# Patient Record
Sex: FEMALE | Race: WHITE | Employment: OTHER | URBAN - METROPOLITAN AREA
[De-identification: names, ages, dates, MRNs, and addresses within clinical notes are randomized per-mention and may not be internally consistent; named-entity substitution may affect disease eponyms.]

---

## 2022-09-01 ENCOUNTER — TRANSCRIBE ORDER (OUTPATIENT)
Dept: SCHEDULING | Age: 72
End: 2022-09-01

## 2022-09-01 DIAGNOSIS — Z96.642 STATUS POST TOTAL HIP REPLACEMENT, LEFT: Primary | ICD-10-CM

## 2022-09-01 DIAGNOSIS — M79.652 LEFT THIGH PAIN: ICD-10-CM

## 2022-09-01 DIAGNOSIS — M54.10 RADICULAR PAIN OF LOWER EXTREMITY: ICD-10-CM

## 2022-09-01 DIAGNOSIS — M54.16 LEFT LUMBAR RADICULOPATHY: ICD-10-CM

## 2022-09-29 ENCOUNTER — HOSPITAL ENCOUNTER (OUTPATIENT)
Dept: MRI IMAGING | Age: 72
Discharge: HOME | End: 2022-09-29
Attending: ORTHOPAEDIC SURGERY
Payer: MEDICARE

## 2022-09-29 DIAGNOSIS — Z96.642 STATUS POST TOTAL HIP REPLACEMENT, LEFT: ICD-10-CM

## 2022-09-29 DIAGNOSIS — M54.10 RADICULAR PAIN OF LOWER EXTREMITY: ICD-10-CM

## 2022-09-29 DIAGNOSIS — M54.16 LEFT LUMBAR RADICULOPATHY: ICD-10-CM

## 2022-09-29 DIAGNOSIS — M79.652 LEFT THIGH PAIN: ICD-10-CM

## 2022-09-29 PROCEDURE — 72148 MRI LUMBAR SPINE W/O DYE: CPT

## 2023-03-16 ENCOUNTER — TRANSCRIBE ORDER (OUTPATIENT)
Dept: SCHEDULING | Age: 73
End: 2023-03-16

## 2023-03-16 DIAGNOSIS — Z96.642 STATUS POST LEFT HIP REPLACEMENT: Primary | ICD-10-CM

## 2023-03-30 ENCOUNTER — HOSPITAL ENCOUNTER (OUTPATIENT)
Dept: NUCLEAR MEDICINE | Age: 73
Discharge: HOME | End: 2023-03-30
Attending: ORTHOPAEDIC SURGERY
Payer: MEDICARE

## 2023-03-30 ENCOUNTER — HOSPITAL ENCOUNTER (OUTPATIENT)
Dept: NUCLEAR MEDICINE | Age: 73
End: 2023-03-30
Attending: ORTHOPAEDIC SURGERY
Payer: MEDICARE

## 2023-03-30 DIAGNOSIS — Z96.642 STATUS POST LEFT HIP REPLACEMENT: ICD-10-CM

## 2023-03-30 PROCEDURE — 78300 BONE IMAGING LIMITED AREA: CPT

## 2023-03-30 PROCEDURE — A9503 TC99M MEDRONATE: HCPCS

## 2023-04-20 ENCOUNTER — TRANSCRIBE ORDER (OUTPATIENT)
Dept: SCHEDULING | Age: 73
End: 2023-04-20

## 2023-04-20 DIAGNOSIS — Z96.642 STATUS POST LEFT HIP REPLACEMENT: Primary | ICD-10-CM

## 2023-04-27 ENCOUNTER — HOSPITAL ENCOUNTER (OUTPATIENT)
Dept: CT IMAGING | Age: 73
End: 2023-04-27
Attending: ORTHOPAEDIC SURGERY
Payer: MEDICARE

## 2023-04-27 DIAGNOSIS — Z96.642 STATUS POST LEFT HIP REPLACEMENT: ICD-10-CM

## 2023-04-27 PROCEDURE — 73700 CT LOWER EXTREMITY W/O DYE: CPT

## 2023-05-22 RX ORDER — GUAIFENESIN 600 MG/1
600 TABLET, EXTENDED RELEASE ORAL 2 TIMES DAILY
COMMUNITY

## 2023-05-22 RX ORDER — SIMVASTATIN 20 MG
TABLET ORAL
COMMUNITY

## 2024-04-26 PROBLEM — Z96.649 PAIN DUE TO HIP JOINT PROSTHESIS (HCC): Status: ACTIVE | Noted: 2024-04-26

## 2024-04-26 PROBLEM — T84.84XA PAIN DUE TO HIP JOINT PROSTHESIS (HCC): Status: ACTIVE | Noted: 2024-04-26

## 2024-05-01 ENCOUNTER — HOSPITAL ENCOUNTER (OUTPATIENT)
Facility: HOSPITAL | Age: 74
Discharge: HOME | End: 2024-05-04
Payer: MEDICARE

## 2024-05-01 VITALS
BODY MASS INDEX: 23.76 KG/M2 | HEIGHT: 64 IN | HEART RATE: 69 BPM | DIASTOLIC BLOOD PRESSURE: 80 MMHG | SYSTOLIC BLOOD PRESSURE: 146 MMHG | TEMPERATURE: 97.8 F | WEIGHT: 139.2 LBS

## 2024-05-01 LAB
ABO + RH BLD: NORMAL
AMORPH CRY URNS QL MICRO: ABNORMAL
ANION GAP SERPL CALC-SCNC: 2 MMOL/L (ref 5–15)
APPEARANCE UR: CLEAR
BACTERIA URNS QL MICRO: NEGATIVE /HPF
BILIRUB UR QL: NEGATIVE
BLOOD GROUP ANTIBODIES SERPL: NORMAL
BUN SERPL-MCNC: 16 MG/DL (ref 6–20)
CALCIUM SERPL-MCNC: 10.3 MG/DL (ref 8.5–10.1)
CHLORIDE SERPL-SCNC: 108 MMOL/L (ref 97–108)
CO2 SERPL-SCNC: 32 MMOL/L (ref 21–32)
COLOR UR: ABNORMAL
CREAT SERPL-MCNC: 0.74 MG/DL (ref 0.55–1.02)
EKG ATRIAL RATE: 63 BPM
EKG DIAGNOSIS: NORMAL
EKG P AXIS: 48 DEGREES
EKG P-R INTERVAL: 134 MS
EKG Q-T INTERVAL: 402 MS
EKG QRS DURATION: 96 MS
EKG QTC CALCULATION (BAZETT): 411 MS
EKG R AXIS: -34 DEGREES
EKG T AXIS: 24 DEGREES
EKG VENTRICULAR RATE: 63 BPM
EPITH CASTS URNS QL MICRO: ABNORMAL /LPF
ERYTHROCYTE [DISTWIDTH] IN BLOOD BY AUTOMATED COUNT: 13 % (ref 11.5–14.5)
EST. AVERAGE GLUCOSE BLD GHB EST-MCNC: 103 MG/DL
GLUCOSE SERPL-MCNC: 88 MG/DL (ref 65–100)
GLUCOSE UR STRIP.AUTO-MCNC: NEGATIVE MG/DL
HBA1C MFR BLD: 5.2 % (ref 4–5.6)
HCT VFR BLD AUTO: 41.3 % (ref 35–47)
HGB BLD-MCNC: 13.5 G/DL (ref 11.5–16)
HGB UR QL STRIP: NEGATIVE
INR PPP: 1 (ref 0.9–1.1)
KETONES UR QL STRIP.AUTO: NEGATIVE MG/DL
LEUKOCYTE ESTERASE UR QL STRIP.AUTO: NEGATIVE
MCH RBC QN AUTO: 29.9 PG (ref 26–34)
MCHC RBC AUTO-ENTMCNC: 32.7 G/DL (ref 30–36.5)
MCV RBC AUTO: 91.4 FL (ref 80–99)
NITRITE UR QL STRIP.AUTO: NEGATIVE
NRBC # BLD: 0 K/UL (ref 0–0.01)
NRBC BLD-RTO: 0 PER 100 WBC
PH UR STRIP: 7 (ref 5–8)
PLATELET # BLD AUTO: 353 K/UL (ref 150–400)
PMV BLD AUTO: 10.6 FL (ref 8.9–12.9)
POTASSIUM SERPL-SCNC: 3.8 MMOL/L (ref 3.5–5.1)
PROT UR STRIP-MCNC: NEGATIVE MG/DL
PROTHROMBIN TIME: 10.4 SEC (ref 9–11.1)
RBC # BLD AUTO: 4.52 M/UL (ref 3.8–5.2)
RBC #/AREA URNS HPF: ABNORMAL /HPF (ref 0–5)
SODIUM SERPL-SCNC: 142 MMOL/L (ref 136–145)
SP GR UR REFRACTOMETRY: <1.005 (ref 1–1.03)
SPECIMEN EXP DATE BLD: NORMAL
URINE CULTURE IF INDICATED: ABNORMAL
UROBILINOGEN UR QL STRIP.AUTO: 0.2 EU/DL (ref 0.2–1)
WBC # BLD AUTO: 5.5 K/UL (ref 3.6–11)
WBC URNS QL MICRO: ABNORMAL /HPF (ref 0–4)

## 2024-05-01 PROCEDURE — 80048 BASIC METABOLIC PNL TOTAL CA: CPT

## 2024-05-01 PROCEDURE — 85027 COMPLETE CBC AUTOMATED: CPT

## 2024-05-01 PROCEDURE — 81001 URINALYSIS AUTO W/SCOPE: CPT

## 2024-05-01 PROCEDURE — 83036 HEMOGLOBIN GLYCOSYLATED A1C: CPT

## 2024-05-01 PROCEDURE — 85610 PROTHROMBIN TIME: CPT

## 2024-05-01 PROCEDURE — 93005 ELECTROCARDIOGRAM TRACING: CPT | Performed by: ORTHOPAEDIC SURGERY

## 2024-05-01 PROCEDURE — 86900 BLOOD TYPING SEROLOGIC ABO: CPT

## 2024-05-01 PROCEDURE — 86901 BLOOD TYPING SEROLOGIC RH(D): CPT

## 2024-05-01 PROCEDURE — APPNB30 APP NON BILLABLE TIME 0-30 MINS: Performed by: NURSE PRACTITIONER

## 2024-05-01 PROCEDURE — 86850 RBC ANTIBODY SCREEN: CPT

## 2024-05-01 PROCEDURE — 36415 COLL VENOUS BLD VENIPUNCTURE: CPT

## 2024-05-01 RX ORDER — DOCUSATE SODIUM 100 MG/1
100 CAPSULE, LIQUID FILLED ORAL 2 TIMES DAILY
COMMUNITY

## 2024-05-01 RX ORDER — ACETAMINOPHEN 160 MG
1 TABLET,DISINTEGRATING ORAL
COMMUNITY

## 2024-05-01 RX ORDER — IBUPROFEN 200 MG
400 TABLET ORAL EVERY 6 HOURS PRN
COMMUNITY

## 2024-05-01 ASSESSMENT — PAIN DESCRIPTION - PAIN TYPE: TYPE: ACUTE PAIN

## 2024-05-01 ASSESSMENT — PAIN DESCRIPTION - FREQUENCY: FREQUENCY: INTERMITTENT

## 2024-05-01 ASSESSMENT — PAIN - FUNCTIONAL ASSESSMENT: PAIN_FUNCTIONAL_ASSESSMENT: PREVENTS OR INTERFERES SOME ACTIVE ACTIVITIES AND ADLS

## 2024-05-01 ASSESSMENT — PAIN DESCRIPTION - DESCRIPTORS: DESCRIPTORS: ACHING;DULL

## 2024-05-01 ASSESSMENT — PAIN DESCRIPTION - ORIENTATION: ORIENTATION: LEFT

## 2024-05-01 ASSESSMENT — PAIN DESCRIPTION - LOCATION: LOCATION: HIP

## 2024-05-01 ASSESSMENT — PAIN DESCRIPTION - ONSET: ONSET: GRADUAL

## 2024-05-01 ASSESSMENT — PAIN SCALES - GENERAL: PAINLEVEL_OUTOF10: 2

## 2024-05-02 LAB
BACTERIA SPEC CULT: NORMAL
BACTERIA SPEC CULT: NORMAL
SERVICE CMNT-IMP: NORMAL

## 2024-05-02 NOTE — PERIOP NOTE
PAT Nurse Practitioner   Pre-Operative Chart Review/Assessment:-ORTHOPEDIC                Patient Name:  Danielle Morris                                                           Age:   73 y.o.    :  1950     Today's Date:  5/3/2024     Date of PAT:   24      Date of Surgery:    5/15/24      Procedure(s):  Left Total Hip Arthroplasty     Anesthesia:   Spinal     Surgeon:   Dr. Graham                       PLAN:      1)  PCP:  To Guzmán MD      2)  Cardiac Clearance:  EKG and METs reviewed. No further cardiac evaluation requested. PAT EKG showed normal sinus rhythm, LAD, and incomplete RBBB.         3)  Diabetic Treatment Consult:  Not indicated. A1c-5.2      4)  Sleep Apnea evaluation:   Not indicated. NIEVES Score 3.       5) Treatment for MRSA/Staph Aureus:  Neg      6) Discharge Plan:  Home w/ spouse      7) Additional Concerns:  HTN, Periodic Limb Mvmt Disorder(recent diagnosis)      8) Special Med Recs:  Hold lisinopril DOS.      Additional Orders for Day of Surgery:  none      Records Scanned in Epic:   none              Vital Signs:        Vitals:    24 0806   BP: (!) 146/80   Pulse: 69   Temp: 97.8 °F (36.6 °C)             Body mass index is 23.89 kg/m².         ____________________________________________  PAST MEDICAL HISTORY  Past Medical History:   Diagnosis Date    Arthritis     Bursitis     Dyslipidemia 2014    Formatting of this note might be different from the original.   Formatting of this note might be different from the original. A.  FLP (10/27/14):  Tot 171, TG 47, HDL 66, LDL 96 (Zocor 20). Formatting of this note might be different from the original. A.  FLP (10/27/14):  Tot 171, TG 47, HDL 66, LDL 96 (Zocor 20).   Formatting of this note might be different from the original. A.  FLP (10/27/14):     Hashimoto's thyroiditis     NO MEDS    History of blood transfusion     ,   INTERNAL BLEEDING FROM LOW DOSE ASPIRIN    Hyperlipidemia     Hypertension     
PAT: 5-1 @ 8;00 CLASS COMPLETED  
important role in your health and preparation for surgery. To reduce the germs on your skin you will need to shower with CHG soap (Chorhexidine gluconate 4%) two times before surgery.    CHG soap (Hibiclens, Hex-A-Clens or store brand)  CHG soap will be provided at your Preadmission Testing (PAT) appointment.  If you do not have a PAT appointment before surgery, you may arrange to  CHG soap from our office or purchase CHG soap at a pharmacy, grocery or department store.  You need to purchase TWO 4 ounce bottles to use for your 2 showers.    Steps to follow:  Wash your hair with your normal shampoo and your body with regular soap and rinse well to remove shampoo and soap from your skin.  Wet a clean washcloth and turn off the shower.  Put CHG soap on washcloth and apply to your entire body from the neck down. Do not use on your head, face or private parts(genitals). Do not use CHG soap on open sores, wounds or areas of skin irritation.  Wash you body gently for 5 minutes. Do not wash your skin too hard. This soap does not create lather. Pay special attention to your underarms and from your belly button to your feet.  Turn the shower back on and rinse well to get CHG soap off your body.  Pat your skin dry with a clean, dry towel. Do not apply lotions or moisturizer.  Put on clean clothes and sleep on fresh bed sheets and do not allow pets to sleep with you.    Shower with CHG soap 2 times before your surgery  The evening before your surgery  The morning of your surgery      Tips to help prevent infections after your surgery:  Protect your surgical wound from germs:  Hand washing is the most important thing you and your caregivers can do to prevent infections.  Keep your bandage clean and dry!  Do not touch your surgical wound.  Use clean, freshly washed towels and washcloths every time you shower; do not share bath linens with others.  Until your surgical wound is healed, wear clothing and sleep on bed linens

## 2024-05-03 PROBLEM — Z01.818 ENCOUNTER FOR PREADMISSION TESTING: Status: ACTIVE | Noted: 2024-05-03

## 2024-05-14 NOTE — DISCHARGE INSTRUCTIONS
Discharge Instructions Hip Replacement  Dr. Graham    Patient Name:  Danielle Morris    Follow up care  Follow up visit with Dr. Graham in 4 weeks.  Call [phone] extension [phone] (Joseph) to make an appointment.  If Home Health has been arranged for you, they will call you to arrange dates/times for visits. Call them if you do not hear from them within 24 hours after you go home.    Activity at home  AVOID sudden and extreme movement of your hip (surgical leg)  Take a short walk every hour; except at night when sleeping.  Do your Home Exercise Program 3 times every day.   After exercising lie down and elevate your leg on pillows for 15-30 minutes to decrease swelling.  Refer to your patient notebook for more information.  Bathing and caring for your incision  You may take a shower with your waterproof dressing on your hip.  Be sure to dry the area between your abdominal fold after showering and keep this area dry throughout the day.   The waterproof dressing is to stay on your hip for 7 days.  On the 7th day have someone gently peel the dressing off by lifting the edge and stretching it to break the seal.  You may then leave your incision open to air unless you see drainage from your hip.     Preventing blood clots  Take Aspirin 81mg twice each day for one month (30 days) following surgery  Call Dr. Graham for signs of a blood clot in your leg: calf pain, tenderness, redness, swelling of lower leg   Preventing lung congestion  Use your incentive spirometer 4 times a day; do 10 repetitions each time  Remember to keep the small blue ball between the two arrows when taking a slow, deep breath   Pain Management  Get up and walk a short distance to relieve pain and stiffness.  Place ice wrap on your hip except when you are walking. The gel ice packs should be changed about every 4 hours.  Elevate your leg on pillows for 15-30 minutes.  Pain Medications  Take Tylenol 650mg (take two 325mg tablets) every 6 hours for

## 2024-05-15 ENCOUNTER — ANESTHESIA (OUTPATIENT)
Facility: HOSPITAL | Age: 74
End: 2024-05-15
Payer: MEDICARE

## 2024-05-15 ENCOUNTER — APPOINTMENT (OUTPATIENT)
Facility: HOSPITAL | Age: 74
DRG: 468 | End: 2024-05-15
Attending: ORTHOPAEDIC SURGERY
Payer: MEDICARE

## 2024-05-15 ENCOUNTER — HOSPITAL ENCOUNTER (INPATIENT)
Facility: HOSPITAL | Age: 74
LOS: 1 days | Discharge: HOME | DRG: 468 | End: 2024-05-16
Attending: ORTHOPAEDIC SURGERY | Admitting: ORTHOPAEDIC SURGERY
Payer: MEDICARE

## 2024-05-15 ENCOUNTER — ANESTHESIA EVENT (OUTPATIENT)
Facility: HOSPITAL | Age: 74
End: 2024-05-15
Payer: MEDICARE

## 2024-05-15 DIAGNOSIS — Z96.649 S/P REVISION OF TOTAL HIP: Primary | ICD-10-CM

## 2024-05-15 PROBLEM — T84.84XA PAINFUL ORTHOPAEDIC HARDWARE (HCC): Status: ACTIVE | Noted: 2024-05-15

## 2024-05-15 LAB
GLUCOSE BLD STRIP.AUTO-MCNC: 128 MG/DL (ref 65–117)
GLUCOSE BLD STRIP.AUTO-MCNC: 98 MG/DL (ref 65–117)
SERVICE CMNT-IMP: ABNORMAL
SERVICE CMNT-IMP: NORMAL

## 2024-05-15 PROCEDURE — 2709999900 HC NON-CHARGEABLE SUPPLY: Performed by: ORTHOPAEDIC SURGERY

## 2024-05-15 PROCEDURE — 6370000000 HC RX 637 (ALT 250 FOR IP): Performed by: PHYSICIAN ASSISTANT

## 2024-05-15 PROCEDURE — 6360000002 HC RX W HCPCS

## 2024-05-15 PROCEDURE — 3600000005 HC SURGERY LEVEL 5 BASE: Performed by: ORTHOPAEDIC SURGERY

## 2024-05-15 PROCEDURE — 2500000003 HC RX 250 WO HCPCS

## 2024-05-15 PROCEDURE — 6360000002 HC RX W HCPCS: Performed by: PHYSICIAN ASSISTANT

## 2024-05-15 PROCEDURE — C9290 INJ, BUPIVACAINE LIPOSOME: HCPCS | Performed by: ORTHOPAEDIC SURGERY

## 2024-05-15 PROCEDURE — 97161 PT EVAL LOW COMPLEX 20 MIN: CPT

## 2024-05-15 PROCEDURE — 2580000003 HC RX 258: Performed by: PHYSICIAN ASSISTANT

## 2024-05-15 PROCEDURE — 2580000003 HC RX 258

## 2024-05-15 PROCEDURE — 6360000002 HC RX W HCPCS: Performed by: ORTHOPAEDIC SURGERY

## 2024-05-15 PROCEDURE — 7100000000 HC PACU RECOVERY - FIRST 15 MIN: Performed by: ORTHOPAEDIC SURGERY

## 2024-05-15 PROCEDURE — 0SPB0JZ REMOVAL OF SYNTHETIC SUBSTITUTE FROM LEFT HIP JOINT, OPEN APPROACH: ICD-10-PCS | Performed by: ORTHOPAEDIC SURGERY

## 2024-05-15 PROCEDURE — 2780000010 HC IMPLANT OTHER: Performed by: ORTHOPAEDIC SURGERY

## 2024-05-15 PROCEDURE — 6360000002 HC RX W HCPCS: Performed by: ANESTHESIOLOGY

## 2024-05-15 PROCEDURE — 3600000015 HC SURGERY LEVEL 5 ADDTL 15MIN: Performed by: ORTHOPAEDIC SURGERY

## 2024-05-15 PROCEDURE — 2580000003 HC RX 258: Performed by: ORTHOPAEDIC SURGERY

## 2024-05-15 PROCEDURE — 3700000001 HC ADD 15 MINUTES (ANESTHESIA): Performed by: ORTHOPAEDIC SURGERY

## 2024-05-15 PROCEDURE — C1776 JOINT DEVICE (IMPLANTABLE): HCPCS | Performed by: ORTHOPAEDIC SURGERY

## 2024-05-15 PROCEDURE — 97116 GAIT TRAINING THERAPY: CPT

## 2024-05-15 PROCEDURE — 1100000000 HC RM PRIVATE

## 2024-05-15 PROCEDURE — 82962 GLUCOSE BLOOD TEST: CPT

## 2024-05-15 PROCEDURE — 72170 X-RAY EXAM OF PELVIS: CPT

## 2024-05-15 PROCEDURE — 0SRB04A REPLACEMENT OF LEFT HIP JOINT WITH CERAMIC ON POLYETHYLENE SYNTHETIC SUBSTITUTE, UNCEMENTED, OPEN APPROACH: ICD-10-PCS | Performed by: ORTHOPAEDIC SURGERY

## 2024-05-15 PROCEDURE — 2580000003 HC RX 258: Performed by: ANESTHESIOLOGY

## 2024-05-15 PROCEDURE — 3700000000 HC ANESTHESIA ATTENDED CARE: Performed by: ORTHOPAEDIC SURGERY

## 2024-05-15 PROCEDURE — 7100000001 HC PACU RECOVERY - ADDTL 15 MIN: Performed by: ORTHOPAEDIC SURGERY

## 2024-05-15 PROCEDURE — 6370000000 HC RX 637 (ALT 250 FOR IP): Performed by: ANESTHESIOLOGY

## 2024-05-15 DEVICE — EMPHASYS ACETABULAR SHELL THREE-HOLE 48MM CEMENTLESS
Type: IMPLANTABLE DEVICE | Site: HIP | Status: FUNCTIONAL
Brand: EMPHASYS

## 2024-05-15 DEVICE — USE ONLY WITH V40 TAPER STEMS
Type: IMPLANTABLE DEVICE | Site: HIP | Status: FUNCTIONAL
Brand: SLEEVE

## 2024-05-15 DEVICE — EMPHASYS POLYETHYLENE LINER AOX NEUTRAL 48MM 36MM
Type: IMPLANTABLE DEVICE | Site: HIP | Status: FUNCTIONAL
Brand: EMPHASYS

## 2024-05-15 DEVICE — CERAMIC C-TAPER FEMORAL HEAD
Type: IMPLANTABLE DEVICE | Site: HIP | Status: FUNCTIONAL
Brand: BIOLOX

## 2024-05-15 RX ORDER — SODIUM CHLORIDE 9 MG/ML
INJECTION, SOLUTION INTRAVENOUS PRN
Status: DISCONTINUED | OUTPATIENT
Start: 2024-05-15 | End: 2024-05-15 | Stop reason: HOSPADM

## 2024-05-15 RX ORDER — DEXAMETHASONE SODIUM PHOSPHATE 10 MG/ML
8 INJECTION, SOLUTION INTRAMUSCULAR; INTRAVENOUS ONCE
Status: DISCONTINUED | OUTPATIENT
Start: 2024-05-15 | End: 2024-05-15 | Stop reason: HOSPADM

## 2024-05-15 RX ORDER — ONDANSETRON 2 MG/ML
4 INJECTION INTRAMUSCULAR; INTRAVENOUS EVERY 6 HOURS PRN
Status: DISCONTINUED | OUTPATIENT
Start: 2024-05-15 | End: 2024-05-16 | Stop reason: HOSPADM

## 2024-05-15 RX ORDER — HYDRALAZINE HYDROCHLORIDE 20 MG/ML
10 INJECTION INTRAMUSCULAR; INTRAVENOUS
Status: DISCONTINUED | OUTPATIENT
Start: 2024-05-15 | End: 2024-05-15 | Stop reason: HOSPADM

## 2024-05-15 RX ORDER — CELECOXIB 100 MG/1
100 CAPSULE ORAL ONCE
Status: COMPLETED | OUTPATIENT
Start: 2024-05-15 | End: 2024-05-15

## 2024-05-15 RX ORDER — SODIUM CHLORIDE 0.9 % (FLUSH) 0.9 %
5-40 SYRINGE (ML) INJECTION PRN
Status: DISCONTINUED | OUTPATIENT
Start: 2024-05-15 | End: 2024-05-16 | Stop reason: HOSPADM

## 2024-05-15 RX ORDER — SODIUM CHLORIDE 9 MG/ML
INJECTION, SOLUTION INTRAVENOUS PRN
Status: DISCONTINUED | OUTPATIENT
Start: 2024-05-15 | End: 2024-05-15 | Stop reason: SDUPTHER

## 2024-05-15 RX ORDER — SODIUM CHLORIDE 9 MG/ML
INJECTION, SOLUTION INTRAVENOUS PRN
Status: DISCONTINUED | OUTPATIENT
Start: 2024-05-15 | End: 2024-05-16 | Stop reason: HOSPADM

## 2024-05-15 RX ORDER — OXYCODONE HYDROCHLORIDE 5 MG/1
5 TABLET ORAL
Status: DISCONTINUED | OUTPATIENT
Start: 2024-05-15 | End: 2024-05-15 | Stop reason: HOSPADM

## 2024-05-15 RX ORDER — SODIUM CHLORIDE 0.9 % (FLUSH) 0.9 %
5-40 SYRINGE (ML) INJECTION EVERY 12 HOURS SCHEDULED
Status: DISCONTINUED | OUTPATIENT
Start: 2024-05-15 | End: 2024-05-16 | Stop reason: HOSPADM

## 2024-05-15 RX ORDER — FENTANYL CITRATE 50 UG/ML
100 INJECTION, SOLUTION INTRAMUSCULAR; INTRAVENOUS
Status: DISCONTINUED | OUTPATIENT
Start: 2024-05-15 | End: 2024-05-15 | Stop reason: HOSPADM

## 2024-05-15 RX ORDER — SODIUM CHLORIDE 0.9 % (FLUSH) 0.9 %
5-40 SYRINGE (ML) INJECTION EVERY 12 HOURS SCHEDULED
Status: DISCONTINUED | OUTPATIENT
Start: 2024-05-15 | End: 2024-05-15 | Stop reason: HOSPADM

## 2024-05-15 RX ORDER — HYDROXYZINE HYDROCHLORIDE 10 MG/1
10 TABLET, FILM COATED ORAL EVERY 8 HOURS PRN
Status: DISCONTINUED | OUTPATIENT
Start: 2024-05-15 | End: 2024-05-16 | Stop reason: HOSPADM

## 2024-05-15 RX ORDER — FAMOTIDINE 20 MG/1
20 TABLET, FILM COATED ORAL 2 TIMES DAILY
Status: DISCONTINUED | OUTPATIENT
Start: 2024-05-15 | End: 2024-05-16 | Stop reason: HOSPADM

## 2024-05-15 RX ORDER — ACETAMINOPHEN 500 MG
1000 TABLET ORAL ONCE
Status: COMPLETED | OUTPATIENT
Start: 2024-05-15 | End: 2024-05-15

## 2024-05-15 RX ORDER — TRAMADOL HYDROCHLORIDE 50 MG/1
50 TABLET ORAL EVERY 6 HOURS PRN
Status: DISCONTINUED | OUTPATIENT
Start: 2024-05-15 | End: 2024-05-16 | Stop reason: HOSPADM

## 2024-05-15 RX ORDER — ACETAMINOPHEN 500 MG
1000 TABLET ORAL ONCE
Status: DISCONTINUED | OUTPATIENT
Start: 2024-05-15 | End: 2024-05-15 | Stop reason: HOSPADM

## 2024-05-15 RX ORDER — PROCHLORPERAZINE EDISYLATE 5 MG/ML
5 INJECTION INTRAMUSCULAR; INTRAVENOUS
Status: DISCONTINUED | OUTPATIENT
Start: 2024-05-15 | End: 2024-05-15 | Stop reason: HOSPADM

## 2024-05-15 RX ORDER — SENNA AND DOCUSATE SODIUM 50; 8.6 MG/1; MG/1
1 TABLET, FILM COATED ORAL 2 TIMES DAILY
Status: DISCONTINUED | OUTPATIENT
Start: 2024-05-15 | End: 2024-05-16 | Stop reason: HOSPADM

## 2024-05-15 RX ORDER — POLYETHYLENE GLYCOL 3350 17 G/17G
17 POWDER, FOR SOLUTION ORAL DAILY
Status: DISCONTINUED | OUTPATIENT
Start: 2024-05-15 | End: 2024-05-16 | Stop reason: HOSPADM

## 2024-05-15 RX ORDER — LIDOCAINE HYDROCHLORIDE 10 MG/ML
1 INJECTION, SOLUTION EPIDURAL; INFILTRATION; INTRACAUDAL; PERINEURAL
Status: DISCONTINUED | OUTPATIENT
Start: 2024-05-15 | End: 2024-05-15 | Stop reason: HOSPADM

## 2024-05-15 RX ORDER — TRANEXAMIC ACID 100 MG/ML
INJECTION, SOLUTION INTRAVENOUS PRN
Status: DISCONTINUED | OUTPATIENT
Start: 2024-05-15 | End: 2024-05-15 | Stop reason: SDUPTHER

## 2024-05-15 RX ORDER — ROSUVASTATIN CALCIUM 10 MG/1
20 TABLET, COATED ORAL EVERY EVENING
Status: DISCONTINUED | OUTPATIENT
Start: 2024-05-15 | End: 2024-05-16 | Stop reason: HOSPADM

## 2024-05-15 RX ORDER — SODIUM CHLORIDE 0.9 % (FLUSH) 0.9 %
5-40 SYRINGE (ML) INJECTION PRN
Status: DISCONTINUED | OUTPATIENT
Start: 2024-05-15 | End: 2024-05-15 | Stop reason: HOSPADM

## 2024-05-15 RX ORDER — SODIUM CHLORIDE, SODIUM LACTATE, POTASSIUM CHLORIDE, CALCIUM CHLORIDE 600; 310; 30; 20 MG/100ML; MG/100ML; MG/100ML; MG/100ML
INJECTION, SOLUTION INTRAVENOUS CONTINUOUS PRN
Status: DISCONTINUED | OUTPATIENT
Start: 2024-05-15 | End: 2024-05-15 | Stop reason: SDUPTHER

## 2024-05-15 RX ORDER — LISINOPRIL 5 MG/1
5 TABLET ORAL DAILY
Status: DISCONTINUED | OUTPATIENT
Start: 2024-05-16 | End: 2024-05-16 | Stop reason: HOSPADM

## 2024-05-15 RX ORDER — MIDAZOLAM HYDROCHLORIDE 1 MG/ML
INJECTION INTRAMUSCULAR; INTRAVENOUS PRN
Status: DISCONTINUED | OUTPATIENT
Start: 2024-05-15 | End: 2024-05-15 | Stop reason: SDUPTHER

## 2024-05-15 RX ORDER — OXYCODONE HYDROCHLORIDE 5 MG/1
5 TABLET ORAL EVERY 4 HOURS PRN
Status: DISCONTINUED | OUTPATIENT
Start: 2024-05-15 | End: 2024-05-16 | Stop reason: HOSPADM

## 2024-05-15 RX ORDER — NALOXONE HYDROCHLORIDE 0.4 MG/ML
0.4 INJECTION, SOLUTION INTRAMUSCULAR; INTRAVENOUS; SUBCUTANEOUS PRN
Status: DISCONTINUED | OUTPATIENT
Start: 2024-05-15 | End: 2024-05-16 | Stop reason: HOSPADM

## 2024-05-15 RX ORDER — SODIUM CHLORIDE 9 MG/ML
INJECTION, SOLUTION INTRAVENOUS CONTINUOUS
Status: DISCONTINUED | OUTPATIENT
Start: 2024-05-15 | End: 2024-05-16 | Stop reason: HOSPADM

## 2024-05-15 RX ORDER — NALOXONE HYDROCHLORIDE 0.4 MG/ML
INJECTION, SOLUTION INTRAMUSCULAR; INTRAVENOUS; SUBCUTANEOUS PRN
Status: DISCONTINUED | OUTPATIENT
Start: 2024-05-15 | End: 2024-05-15 | Stop reason: HOSPADM

## 2024-05-15 RX ORDER — SODIUM CHLORIDE 9 MG/ML
INJECTION, SOLUTION INTRAVENOUS CONTINUOUS PRN
Status: DISCONTINUED | OUTPATIENT
Start: 2024-05-15 | End: 2024-05-15 | Stop reason: SDUPTHER

## 2024-05-15 RX ORDER — ACETAMINOPHEN 325 MG/1
650 TABLET ORAL EVERY 6 HOURS
Status: DISCONTINUED | OUTPATIENT
Start: 2024-05-15 | End: 2024-05-16 | Stop reason: HOSPADM

## 2024-05-15 RX ORDER — HYDROMORPHONE HYDROCHLORIDE 1 MG/ML
0.5 INJECTION, SOLUTION INTRAMUSCULAR; INTRAVENOUS; SUBCUTANEOUS
Status: DISCONTINUED | OUTPATIENT
Start: 2024-05-15 | End: 2024-05-16 | Stop reason: HOSPADM

## 2024-05-15 RX ORDER — HYDROMORPHONE HYDROCHLORIDE 1 MG/ML
0.5 INJECTION, SOLUTION INTRAMUSCULAR; INTRAVENOUS; SUBCUTANEOUS EVERY 5 MIN PRN
Status: DISCONTINUED | OUTPATIENT
Start: 2024-05-15 | End: 2024-05-15 | Stop reason: HOSPADM

## 2024-05-15 RX ORDER — ASPIRIN 81 MG/1
81 TABLET ORAL 2 TIMES DAILY
Status: DISCONTINUED | OUTPATIENT
Start: 2024-05-16 | End: 2024-05-16 | Stop reason: HOSPADM

## 2024-05-15 RX ORDER — SODIUM CHLORIDE, SODIUM LACTATE, POTASSIUM CHLORIDE, CALCIUM CHLORIDE 600; 310; 30; 20 MG/100ML; MG/100ML; MG/100ML; MG/100ML
INJECTION, SOLUTION INTRAVENOUS CONTINUOUS
Status: DISCONTINUED | OUTPATIENT
Start: 2024-05-15 | End: 2024-05-15 | Stop reason: HOSPADM

## 2024-05-15 RX ORDER — ONDANSETRON 4 MG/1
4 TABLET, ORALLY DISINTEGRATING ORAL EVERY 8 HOURS PRN
Status: DISCONTINUED | OUTPATIENT
Start: 2024-05-15 | End: 2024-05-16 | Stop reason: HOSPADM

## 2024-05-15 RX ORDER — ONDANSETRON 2 MG/ML
4 INJECTION INTRAMUSCULAR; INTRAVENOUS
Status: COMPLETED | OUTPATIENT
Start: 2024-05-15 | End: 2024-05-15

## 2024-05-15 RX ORDER — FENTANYL CITRATE 50 UG/ML
25 INJECTION, SOLUTION INTRAMUSCULAR; INTRAVENOUS EVERY 5 MIN PRN
Status: DISCONTINUED | OUTPATIENT
Start: 2024-05-15 | End: 2024-05-15 | Stop reason: HOSPADM

## 2024-05-15 RX ORDER — KETOROLAC TROMETHAMINE 30 MG/ML
15 INJECTION, SOLUTION INTRAMUSCULAR; INTRAVENOUS EVERY 6 HOURS
Status: DISCONTINUED | OUTPATIENT
Start: 2024-05-15 | End: 2024-05-16 | Stop reason: HOSPADM

## 2024-05-15 RX ORDER — CEFAZOLIN SODIUM 1 G/3ML
INJECTION, POWDER, FOR SOLUTION INTRAMUSCULAR; INTRAVENOUS PRN
Status: DISCONTINUED | OUTPATIENT
Start: 2024-05-15 | End: 2024-05-15 | Stop reason: SDUPTHER

## 2024-05-15 RX ORDER — MIDAZOLAM HYDROCHLORIDE 2 MG/2ML
2 INJECTION, SOLUTION INTRAMUSCULAR; INTRAVENOUS
Status: DISCONTINUED | OUTPATIENT
Start: 2024-05-15 | End: 2024-05-15 | Stop reason: HOSPADM

## 2024-05-15 RX ADMIN — ONDANSETRON 4 MG: 2 INJECTION INTRAMUSCULAR; INTRAVENOUS at 13:14

## 2024-05-15 RX ADMIN — FENTANYL CITRATE 25 MCG: 50 INJECTION INTRAMUSCULAR; INTRAVENOUS at 13:11

## 2024-05-15 RX ADMIN — CELECOXIB 100 MG: 100 CAPSULE ORAL at 11:01

## 2024-05-15 RX ADMIN — POLYETHYLENE GLYCOL 3350 17 G: 17 POWDER, FOR SOLUTION ORAL at 17:21

## 2024-05-15 RX ADMIN — SODIUM CHLORIDE: 9 INJECTION, SOLUTION INTRAVENOUS at 13:38

## 2024-05-15 RX ADMIN — SODIUM CHLORIDE: 9 INJECTION, SOLUTION INTRAVENOUS at 12:03

## 2024-05-15 RX ADMIN — CEFAZOLIN 2 G: 330 INJECTION, POWDER, FOR SOLUTION INTRAMUSCULAR; INTRAVENOUS at 11:32

## 2024-05-15 RX ADMIN — PROPOFOL 50 MCG/KG/MIN: 10 INJECTION, EMULSION INTRAVENOUS at 11:09

## 2024-05-15 RX ADMIN — PHENYLEPHRINE HYDROCHLORIDE 20 MCG/MIN: 10 INJECTION INTRAVENOUS at 12:03

## 2024-05-15 RX ADMIN — TRANEXAMIC ACID 1000 MG: 100 INJECTION, SOLUTION INTRAVENOUS at 11:32

## 2024-05-15 RX ADMIN — SENNOSIDES AND DOCUSATE SODIUM 1 TABLET: 50; 8.6 TABLET ORAL at 21:43

## 2024-05-15 RX ADMIN — SODIUM CHLORIDE, POTASSIUM CHLORIDE, SODIUM LACTATE AND CALCIUM CHLORIDE: 600; 310; 30; 20 INJECTION, SOLUTION INTRAVENOUS at 11:09

## 2024-05-15 RX ADMIN — WATER 2000 MG: 1 INJECTION INTRAMUSCULAR; INTRAVENOUS; SUBCUTANEOUS at 19:22

## 2024-05-15 RX ADMIN — FAMOTIDINE 20 MG: 20 TABLET ORAL at 21:43

## 2024-05-15 RX ADMIN — SODIUM CHLORIDE, POTASSIUM CHLORIDE, SODIUM LACTATE AND CALCIUM CHLORIDE: 600; 310; 30; 20 INJECTION, SOLUTION INTRAVENOUS at 10:53

## 2024-05-15 RX ADMIN — ACETAMINOPHEN 650 MG: 325 TABLET ORAL at 19:22

## 2024-05-15 RX ADMIN — MEPIVACAINE HYDROCHLORIDE 30 MG: 15 INJECTION, SOLUTION EPIDURAL; INFILTRATION at 11:20

## 2024-05-15 RX ADMIN — OXYCODONE 5 MG: 5 TABLET ORAL at 21:43

## 2024-05-15 RX ADMIN — MIDAZOLAM 2 MG: 1 INJECTION INTRAMUSCULAR; INTRAVENOUS at 11:09

## 2024-05-15 RX ADMIN — OXYCODONE 5 MG: 5 TABLET ORAL at 15:03

## 2024-05-15 RX ADMIN — ACETAMINOPHEN 1000 MG: 500 TABLET ORAL at 11:01

## 2024-05-15 RX ADMIN — ROSUVASTATIN CALCIUM 20 MG: 10 TABLET, FILM COATED ORAL at 17:21

## 2024-05-15 RX ADMIN — KETOROLAC TROMETHAMINE 15 MG: 30 INJECTION, SOLUTION INTRAMUSCULAR at 21:43

## 2024-05-15 ASSESSMENT — PAIN DESCRIPTION - ORIENTATION
ORIENTATION: LEFT
ORIENTATION: LEFT

## 2024-05-15 ASSESSMENT — PAIN SCALES - GENERAL
PAINLEVEL_OUTOF10: 5
PAINLEVEL_OUTOF10: 9
PAINLEVEL_OUTOF10: 0
PAINLEVEL_OUTOF10: 5
PAINLEVEL_OUTOF10: 2
PAINLEVEL_OUTOF10: 5
PAINLEVEL_OUTOF10: 0

## 2024-05-15 ASSESSMENT — PAIN DESCRIPTION - LOCATION
LOCATION: HIP
LOCATION: HIP

## 2024-05-15 ASSESSMENT — PAIN - FUNCTIONAL ASSESSMENT: PAIN_FUNCTIONAL_ASSESSMENT: NONE - DENIES PAIN

## 2024-05-15 ASSESSMENT — PAIN DESCRIPTION - DESCRIPTORS: DESCRIPTORS: ACHING

## 2024-05-15 NOTE — PERIOP NOTE
TRANSFER - OUT REPORT:    Verbal report given to Shae OLIVER  on Danielle Morris  being transferred to [address] [place] [address] for post surgical care     Report consisted of patient’s Situation, Background, Assessment and   Recommendations(SBAR).     Time Pre op antibiotic given:1132  Anesthesia Stop time: 1243    Information from the following report(s) SBAR, MAR, procedure summarywas reviewed with the receiving nurse.    Opportunity for questions and clarification was provided.     Is the patient on 02? yes       L/Min 2    Is the patient on a monitor? no    Is the nurse transporting with the patient? no    Surgical Waiting Area notified of patient's transfer from PACU? yes      The following personal items collected during your admission accompanied patient upon transfer:   Dental Appliance:    Vision:    Hearing Aid:    Jewelry:    Clothing:    Other Valuables:    Valuables sent to safe:   All belongings returned to pt in pacu    1350: Dr. Holden at bedside, verbalized pt is okay to leave pacu to go to room

## 2024-05-15 NOTE — ANESTHESIA PROCEDURE NOTES
Spinal Block    End time: 5/15/2024 11:24 AM  Reason for block: primary anesthetic and at surgeon's request  Staffing  Performed: anesthesiologist   Anesthesiologist: David Phillips MD  Performed by: David Phillips MD  Authorized by: David Phillips MD    Spinal Block  Patient position: sitting  Prep: DuraPrep  Patient monitoring: cardiac monitor, continuous pulse ox, frequent blood pressure checks and oxygen  Approach: midline  Location: L4/L5  Provider prep: mask, sterile gloves and sterile gown  Needle  Needle type: Pencan   Needle gauge: 24 G  Needle length: 4 in  Assessment  Sensory level: T4  Swirl obtained: Yes  CSF: clear  Attempts: 2  Hemodynamics: stable  Preanesthetic Checklist  Completed: patient identified, IV checked, site marked, risks and benefits discussed, surgical/procedural consents, equipment checked, pre-op evaluation, timeout performed, anesthesia consent given, oxygen available, monitors applied/VS acknowledged, fire risk safety assessment completed and verbalized and blood product R/B/A discussed and consented

## 2024-05-15 NOTE — PROGRESS NOTES
Ortho NP Note    POD# 0  s/p LEFT TOTAL HIP REVISION  ARTHROPLASTY   Pt seen with  at bedside.     Pt resting in bed comfortably. Very pleasant.   Reports postop hip pain 5/10, RN notified to start oxycodone   BLE numbness resolved s/p spinal block   Tolerating clears and crackers. No nausea.   Reviewed plan of care. No other complaints or concerns at this time.     VSS Afebrile.    Visit Vitals  BP (!) 154/71   Pulse 55   Temp 97.6 °F (36.4 °C) (Oral)   Resp 16   Ht 1.626 m (5' 4\")   Wt 62.1 kg (137 lb)   SpO2 100%   BMI 23.52 kg/m²       Voiding status: due to void          Labs    Lab Results   Component Value Date/Time    HGB 13.5 05/01/2024 08:25 AM      Lab Results   Component Value Date/Time    INR 1.0 05/01/2024 08:25 AM      Lab Results   Component Value Date/Time     05/01/2024 08:25 AM    K 3.8 05/01/2024 08:25 AM     05/01/2024 08:25 AM    CO2 32 05/01/2024 08:25 AM    BUN 16 05/01/2024 08:25 AM     Recent Glucose Results:   Glucose   Date Value Ref Range Status   05/01/2024 88 65 - 100 mg/dL Final           Body mass index is 23.52 kg/m². : A BMI > 30 is classified as obesity and > 40 is classified as morbid obesity.       Dressing c.d.i  Cryotherapy in place over incision  Calves soft and supple; No pain with passive stretch  Sensation and motor intact. +PF/DF/EHL intact 5/5, +pp  SCDs for mechanical DVT proph while in bed     PLAN:  1) PT BID, OT - WBAT  2) Aspirin 81 mg PO BID for DVT Prophylaxis. Encouraged early mobilization, bed exercises, and SCD use.  3) GI Prophylaxis - Pepcid  4) Pain control - scheduled tylenol  and toradol, and prn  tramadol and oxycodone  .  5) Post op care - Continue bowel regimen, encouraged IS.  Aquacel to remain in place x 7 days unless integrity is lost.   6) Readniess for discharge:     [x] Vital Signs stable    [] Hgb stable    [] + Voiding    [x] Wound intact, drainage minimal    [x] Tolerating PO intake     [] Cleared by PT (OT if applicable)

## 2024-05-15 NOTE — FLOWSHEET NOTE
05/15/24 1142   Family Communication    Relationship to Patient Spouse    Phone Number Albert Morris [phone]   Family/Significant Other Update Called   Delivery Origin Nurse   Family Communication   Family Update Message Procedure started

## 2024-05-15 NOTE — PLAN OF CARE
Problem: Physical Therapy - Adult  Goal: By Discharge: Performs mobility at highest level of function for planned discharge setting.  See evaluation for individualized goals.  Description: FUNCTIONAL STATUS PRIOR TO ADMISSION: Patient was independent and active without use of DME.    HOME SUPPORT PRIOR TO ADMISSION: The patient lived with  but did not require assistance.    Physical Therapy Goals  Initiated 5/15/2024  1.  Patient will move from supine to sit and sit to supine and roll side to side in bed with modified independence within 4 day(s).    2.  Patient will perform sit to stand with modified independence within 4 day(s).  3.  Patient will transfer from bed to chair and chair to bed with modified independence using the least restrictive device within 4 day(s).  4.  Patient will ambulate with modified independence for 150 feet with the least restrictive device within 4 day(s).   5.  Patient will ascend/descend 3 stairs with 1 handrail(s) with modified independence within 4 day(s).  6.  Patient will perform KALEN home exercise program per protocol with modified independence within 4 days.       Outcome: Progressing   PHYSICAL THERAPY EVALUATION    Patient: Danielle Morris (74 y.o. female)  Date: 5/15/2024  Primary Diagnosis: Pain due to hip joint prosthesis (HCC) [T84.84XA, Z96.649]  Painful orthopaedic hardware (HCC) [T84.84XA]  Procedure(s) (LRB):  LEFT TOTAL HIP REVISION  ARTHROPLASTY (Left) Day of Surgery   Precautions:                        ASSESSMENT :   DEFICITS/IMPAIRMENTS:   The patient is limited by decreased  mobility compared to baseline after L KALEN conversion from hemiarthroplasty.  She had originally fractured her L hip in 2021 and underwent posterior hemiarthroplasty repair.        At this time, her pain is under control and she was medicated for pain prior to therapy session.  She was able to mobilize to sitting EOB and ambulate with RW with min assist overall and VSS.  Reviewed plan

## 2024-05-15 NOTE — ANESTHESIA POSTPROCEDURE EVALUATION
Post-Anesthesia Evaluation and Assessment    Patient: Danielle Morris MRN: [id number]  SSN: [id number]    YOB: 1950  Age: 74 y.o.  Sex: female      I have evaluated the patient and they are stable and ready for discharge from the PACU.     Cardiovascular Function/Vital Signs  Visit Vitals  BP (!) 154/71   Pulse 55   Temp 97.6 °F (36.4 °C) (Oral)   Resp 16   Ht 1.626 m (5' 4\")   Wt 62.1 kg (137 lb)   SpO2 100%   BMI 23.52 kg/m²       Patient is status post Spinal anesthesia for Procedure(s):  LEFT TOTAL HIP REVISION  ARTHROPLASTY.    Nausea/Vomiting: None    Postoperative hydration reviewed and adequate.    Pain:  Managed    Neurological Status:   At baseline    Mental Status, Level of Consciousness: Alert and  oriented to person, place, and time    Pulmonary Status:   Adequate oxygenation and airway patent    Complications related to anesthesia: None    Post-anesthesia assessment completed. No concerns    Signed By: Yassine Holden MD     May 15, 2024

## 2024-05-15 NOTE — ANESTHESIA PRE PROCEDURE
Formatting of this note might be different from the original. DMITRIY LEONARDO (10/27/14):    • Hashimoto's thyroiditis     NO MEDS   • History of blood transfusion     2004, 2011  INTERNAL BLEEDING FROM LOW DOSE ASPIRIN   • Hyperlipidemia    • Hypertension    • Periodic limb movement disorder     03/2024       Past Surgical History:        Procedure Laterality Date   • BLEPHAROPLASTY Bilateral    • COLONOSCOPY     • ENDOSCOPY, COLON, DIAGNOSTIC     • EYE SURGERY Right     CATARACT   • HIP SURGERY Left 12/3/2021    PARTIAL HIP REPLACEMENT   • HYSTERECTOMY, TOTAL ABDOMINAL (CERVIX REMOVED)     • LITHOTRIPSY     • TONSILLECTOMY     • WISDOM TOOTH EXTRACTION         Social History:    Social History     Tobacco Use   • Smoking status: Never   • Smokeless tobacco: Never   Substance Use Topics   • Alcohol use: Yes     Alcohol/week: 7.0 standard drinks of alcohol     Types: 7 Glasses of wine per week                                Counseling given: Not Answered      Vital Signs (Current): There were no vitals filed for this visit.                                           BP Readings from Last 3 Encounters:   05/01/24 (!) 146/80       NPO Status:                                                                                 BMI:   Wt Readings from Last 3 Encounters:   05/01/24 63.1 kg (139 lb 3.2 oz)   04/25/24 62.6 kg (138 lb)     There is no height or weight on file to calculate BMI.    CBC:   Lab Results   Component Value Date/Time    WBC 5.5 05/01/2024 08:25 AM    RBC 4.52 05/01/2024 08:25 AM    HGB 13.5 05/01/2024 08:25 AM    HCT 41.3 05/01/2024 08:25 AM    MCV 91.4 05/01/2024 08:25 AM    RDW 13.0 05/01/2024 08:25 AM     05/01/2024 08:25 AM       CMP:   Lab Results   Component Value Date/Time     05/01/2024 08:25 AM    K 3.8 05/01/2024 08:25 AM     05/01/2024 08:25 AM    CO2 32 05/01/2024 08:25 AM    BUN 16 05/01/2024 08:25 AM    CREATININE 0.74 05/01/2024 08:25 AM    LABGLOM 84 05/01/2024 08:25 AM

## 2024-05-15 NOTE — H&P
Update History & Physical    The patient's History and Physical  was reviewed with the patient and I examined the patient. There was no change. The surgical site was confirmed by the patient and me.       Plan: The risks, benefits, expected outcome, and alternative to the recommended procedure have been discussed with the patient. Patient understands and wants to proceed with the procedure.     Electronically signed by Jori Graham MD on 5/15/2024 at 10:14 AM

## 2024-05-15 NOTE — OP NOTE
[address]  [address]                            OPERATIVE REPORT      PATIENT NAME: NATIVIDAD JOHNSON            : 1950  MED REC NO: [id number]                       ROOM: 564  ACCOUNT NO: [id number]                       ADMIT DATE: 05/15/2024  PROVIDER: Jori Graham MD    DATE OF SERVICE:  05/15/2024    PREOPERATIVE DIAGNOSES:  Painful left hip hemiarthroplasty.    POSTOPERATIVE DIAGNOSES:  Painful left hip hemiarthroplasty.    PROCEDURES PERFORMED:  Conversion to left total hip arthroplasty.    SURGEON:  Jori Graham MD    ASSISTANT:  Adelita Lewis PA-C.    ANESTHESIA:  Spinal with sedation.    ESTIMATED BLOOD LOSS:  150.    SPECIMENS REMOVED:  None.    INTRAOPERATIVE FINDINGS:  ***     COMPLICATIONS:  None.    IMPLANTS:  Included a DePuy size 50 mm acetabular shell with a 36 mm neutral liner and a 36 mm +0 ceramic head ball with titanium sleeve.    INDICATIONS:  This is a 74-year-old female who had a previous femoral neck fracture and underwent unipolar hemiarthroplasty by another surgeon.  She developed anterior groin pain.  She is 74 years old and still very active.  We performed imaging studies and a workup and discussed her options.  She does have evidence of cysts in her acetabulum and we discussed the possibility that she has been deriving pain from her metal articulation on cartilage.  I told her there is no guarantee this will completely eliminate her symptoms.  She understood these and wished to proceed.  Risks and benefits discussed and the consent was obtained.    DESCRIPTION OF PROCEDURE:  The patient was identified in the preoperative holding area.  The correct site was marked and confirmed.  She was taken to the operating room and placed supine after spinal block was placed.  She was prepped and draped in standard sterile fashion.  She received 2 g of cefazolin and 1 g of tranexamic acid prior to incision.

## 2024-05-16 VITALS
RESPIRATION RATE: 16 BRPM | TEMPERATURE: 97.5 F | HEART RATE: 74 BPM | BODY MASS INDEX: 23.39 KG/M2 | HEIGHT: 64 IN | WEIGHT: 137 LBS | SYSTOLIC BLOOD PRESSURE: 143 MMHG | DIASTOLIC BLOOD PRESSURE: 64 MMHG | OXYGEN SATURATION: 97 %

## 2024-05-16 LAB
ANION GAP SERPL CALC-SCNC: 4 MMOL/L (ref 5–15)
BUN SERPL-MCNC: 11 MG/DL (ref 6–20)
BUN/CREAT SERPL: 15 (ref 12–20)
CALCIUM SERPL-MCNC: 8.6 MG/DL (ref 8.5–10.1)
CHLORIDE SERPL-SCNC: 115 MMOL/L (ref 97–108)
CO2 SERPL-SCNC: 24 MMOL/L (ref 21–32)
CREAT SERPL-MCNC: 0.75 MG/DL (ref 0.55–1.02)
GLUCOSE SERPL-MCNC: 91 MG/DL (ref 65–100)
HCT VFR BLD AUTO: 34.4 % (ref 35–47)
HGB BLD-MCNC: 11.6 G/DL (ref 11.5–16)
POTASSIUM SERPL-SCNC: 3.8 MMOL/L (ref 3.5–5.1)
SODIUM SERPL-SCNC: 143 MMOL/L (ref 136–145)

## 2024-05-16 PROCEDURE — 6370000000 HC RX 637 (ALT 250 FOR IP): Performed by: PHYSICIAN ASSISTANT

## 2024-05-16 PROCEDURE — 97116 GAIT TRAINING THERAPY: CPT

## 2024-05-16 PROCEDURE — 36415 COLL VENOUS BLD VENIPUNCTURE: CPT

## 2024-05-16 PROCEDURE — 97535 SELF CARE MNGMENT TRAINING: CPT

## 2024-05-16 PROCEDURE — 27132 TOTAL HIP ARTHROPLASTY: CPT | Performed by: PHYSICIAN ASSISTANT

## 2024-05-16 PROCEDURE — 85014 HEMATOCRIT: CPT

## 2024-05-16 PROCEDURE — 6360000002 HC RX W HCPCS: Performed by: PHYSICIAN ASSISTANT

## 2024-05-16 PROCEDURE — 27132 TOTAL HIP ARTHROPLASTY: CPT | Performed by: ORTHOPAEDIC SURGERY

## 2024-05-16 PROCEDURE — 80048 BASIC METABOLIC PNL TOTAL CA: CPT

## 2024-05-16 PROCEDURE — APPNB60 APP NON BILLABLE TIME 46-60 MINS: Performed by: NURSE PRACTITIONER

## 2024-05-16 PROCEDURE — 97165 OT EVAL LOW COMPLEX 30 MIN: CPT

## 2024-05-16 PROCEDURE — 85018 HEMOGLOBIN: CPT

## 2024-05-16 PROCEDURE — 2580000003 HC RX 258: Performed by: PHYSICIAN ASSISTANT

## 2024-05-16 RX ORDER — OXYCODONE HYDROCHLORIDE 5 MG/1
5 TABLET ORAL EVERY 4 HOURS PRN
Qty: 30 TABLET | Refills: 0 | Status: SHIPPED | OUTPATIENT
Start: 2024-05-16 | End: 2024-05-21

## 2024-05-16 RX ORDER — ASPIRIN 81 MG/1
81 TABLET ORAL 2 TIMES DAILY
Qty: 60 TABLET | Refills: 0 | Status: SHIPPED | OUTPATIENT
Start: 2024-05-16 | End: 2024-06-15

## 2024-05-16 RX ORDER — NALOXONE HYDROCHLORIDE 4 MG/.1ML
1 SPRAY NASAL PRN
Qty: 1 EACH | Refills: 0 | Status: SHIPPED | OUTPATIENT
Start: 2024-05-16

## 2024-05-16 RX ORDER — POLYETHYLENE GLYCOL 3350 17 G/17G
17 POWDER, FOR SOLUTION ORAL DAILY
Qty: 7 EACH | Refills: 0 | Status: SHIPPED | OUTPATIENT
Start: 2024-05-16 | End: 2024-05-23

## 2024-05-16 RX ORDER — ACETAMINOPHEN 325 MG/1
650 TABLET ORAL EVERY 6 HOURS
Qty: 120 TABLET | Refills: 3 | Status: SHIPPED
Start: 2024-05-16

## 2024-05-16 RX ORDER — MELOXICAM 7.5 MG/1
7.5 TABLET ORAL DAILY
Qty: 30 TABLET | Refills: 0 | Status: CANCELLED | OUTPATIENT
Start: 2024-05-16

## 2024-05-16 RX ORDER — TRAMADOL HYDROCHLORIDE 50 MG/1
50 TABLET ORAL EVERY 6 HOURS PRN
Qty: 28 TABLET | Refills: 0 | Status: SHIPPED | OUTPATIENT
Start: 2024-05-16 | End: 2024-05-23

## 2024-05-16 RX ORDER — FAMOTIDINE 20 MG/1
20 TABLET, FILM COATED ORAL 2 TIMES DAILY
Qty: 60 TABLET | Refills: 0 | Status: SHIPPED | OUTPATIENT
Start: 2024-05-16

## 2024-05-16 RX ADMIN — SODIUM CHLORIDE, PRESERVATIVE FREE 10 ML: 5 INJECTION INTRAVENOUS at 08:34

## 2024-05-16 RX ADMIN — KETOROLAC TROMETHAMINE 15 MG: 30 INJECTION, SOLUTION INTRAMUSCULAR at 03:27

## 2024-05-16 RX ADMIN — LISINOPRIL 5 MG: 5 TABLET ORAL at 08:33

## 2024-05-16 RX ADMIN — KETOROLAC TROMETHAMINE 15 MG: 30 INJECTION, SOLUTION INTRAMUSCULAR at 08:34

## 2024-05-16 RX ADMIN — POLYETHYLENE GLYCOL 3350 17 G: 17 POWDER, FOR SOLUTION ORAL at 08:33

## 2024-05-16 RX ADMIN — FAMOTIDINE 20 MG: 20 TABLET ORAL at 08:34

## 2024-05-16 RX ADMIN — ACETAMINOPHEN 650 MG: 325 TABLET ORAL at 06:07

## 2024-05-16 RX ADMIN — WATER 2000 MG: 1 INJECTION INTRAMUSCULAR; INTRAVENOUS; SUBCUTANEOUS at 03:27

## 2024-05-16 RX ADMIN — ASPIRIN 81 MG: 81 TABLET, COATED ORAL at 08:33

## 2024-05-16 RX ADMIN — SENNOSIDES AND DOCUSATE SODIUM 1 TABLET: 50; 8.6 TABLET ORAL at 08:33

## 2024-05-16 ASSESSMENT — PAIN SCALES - GENERAL: PAINLEVEL_OUTOF10: 4

## 2024-05-16 NOTE — PLAN OF CARE
Problem: Physical Therapy - Adult  Goal: By Discharge: Performs mobility at highest level of function for planned discharge setting.  See evaluation for individualized goals.  Description: FUNCTIONAL STATUS PRIOR TO ADMISSION: Patient was independent and active without use of DME.    HOME SUPPORT PRIOR TO ADMISSION: The patient lived with  but did not require assistance.    Physical Therapy Goals  Initiated 5/15/2024  1.  Patient will move from supine to sit and sit to supine and roll side to side in bed with modified independence within 4 day(s).    2.  Patient will perform sit to stand with modified independence within 4 day(s).  3.  Patient will transfer from bed to chair and chair to bed with modified independence using the least restrictive device within 4 day(s).  4.  Patient will ambulate with modified independence for 150 feet with the least restrictive device within 4 day(s).   5.  Patient will ascend/descend 3 stairs with 1 handrail(s) with modified independence within 4 day(s).  6.  Patient will perform KALEN home exercise program per protocol with modified independence within 4 days.       Outcome: Progressing   PHYSICAL THERAPY TREATMENT/DISCHARGE    Patient: Danielle Morris (74 y.o. female)  Date: 5/16/2024  Diagnosis: Pain due to hip joint prosthesis (HCC) [T84.84XA, Z96.649]  Painful orthopaedic hardware (HCC) [T84.84XA] Pain due to hip joint prosthesis (HCC)  Procedure(s) (LRB):  LEFT TOTAL HIP REVISION  ARTHROPLASTY (Left) 1 Day Post-Op  Precautions:                        ASSESSMENT:  Patient has been followed by skilled PT services and has progressed towards goals. Patient is cleared for discharge from PT standpoint. Patient  is independent with post-op KALEN exercise protocol and has same in written, illlustrated form.  Educated patient on Discharge Instructions relating to PT program progression post-discharge. She demonstrated/verbalized understanding.     Barthel Functional Score is  Supervision  Number of Stairs Trained: 4  Wheelchair Management  Wheelchair Management: No                                                                                                                                                                                                                                        Barthel Index:    Barthel Index Scale  Feeding: Independent, Able to apply any necessary device. Feeds in reasonable time  Bathing: Cannot perform activity  Grooming: Washes face, bond hair, brushes teeth, shaves (manages plug if electric razor)  Dressing: Needs help, but does at least half of task within reasonable time  Bowel Control: No accidents. Able to use enema or suppository if needed  Bladder Control: No accidents. Able to care for collecting device, if used  Toilet Transfers: Independent with toilet or bedpan. Handles clothes, wipes, flushes or cleans crandall  Chair/Bed Trannsfers: Independent, including locks of wheelchair and lifting footrests  Ambulation: Independent for 50 yards. May use assistive devices, except for rolling walker  Stairs: Independent. May use assistive devices  Total Barthel Index Score: 90       The Barthel ADL Index: Guidelines  1. The index should be used as a record of what a patient does, not as a record of what a patient could do.  2. The main aim is to establish degree of independence from any help, physical or verbal, however minor and for whatever reason.  3. The need for supervision renders the patient not independent.  4. A patient's performance should be established using the best available evidence. Asking the patient, friends/relatives and nurses are the usual sources, but direct observation and common sense are also important. However direct testing is not needed.  5. Usually the patient's performance over the preceding 24-48 hours is important, but occasionally longer periods will be relevant.  6. Middle categories imply that the patient supplies over 50

## 2024-05-16 NOTE — PROGRESS NOTES
OCCUPATIONAL THERAPY EVALUATION/DISCHARGE  Patient: Danielle Morris (74 y.o. female)  Date: 5/16/2024  Primary Diagnosis: Pain due to hip joint prosthesis (HCC) [T84.84XA, Z96.649]  Painful orthopaedic hardware (HCC) [T84.84XA]  Procedure(s) (LRB):  LEFT TOTAL HIP REVISION  ARTHROPLASTY (Left) 1 Day Post-Op     Precautions:                    ASSESSMENT :  Pt rec'd sitting up in chair, agreeable to OT eval. Pt is POD#1 L total hip revision arthroplasty. She is currently Mod (I) with RW for functional mobility and completes ADLs at min- Mod (I) level with use of AE. Based on the objective data below, the patient is functioning close to her baseline and further skilled acute occupational therapy is not indicated at this time.    Pt is cleared for discharge from OT standpoint; RN, CM and ortho NP notified.    Pt left sitting up in chair at end of session with needs met and call light in reach.    Functional Outcome Measure:  The patient scored 20/24 on the AM-PAC outcome measure.          PLAN :    Recommendation for discharge: (in order for the patient to meet his/her long term goals): No skilled occupational therapy    Other factors to consider for discharge: no additional factors    IF patient discharges home will need the following DME: patient owns DME required for discharge     SUBJECTIVE:   Patient stated, “I have those from my other hip surgery.” re: AE from [facility] Dec 2021    OBJECTIVE DATA SUMMARY:     Past Medical History:   Diagnosis Date    Arthritis     Bursitis     Dyslipidemia 11/12/2014    Formatting of this note might be different from the original.   Formatting of this note might be different from the original. A.  FLP (10/27/14):  Tot 171, TG 47, HDL 66, LDL 96 (Zocor 20). Formatting of this note might be different from the original. A.  FLP (10/27/14):  Tot 171, TG 47, HDL 66, LDL 96 (Zocor 20).   Formatting of this note might be different from the original. A.  FLP (10/27/14):     Hashimoto's  functional mobility with minimal cues.       Pt was instructed on the following    Bathing:   -do not submerge or soak wound in water  -follow doctors instructions on wound care and bandage   -Patient verbalized understanding   -if any questions arise pt was educated to contact their surgeon's office    Dressing joint: Patient instructed and demonstrated understanding to don/doff left LE first/last with minimal cues. Patient instructed and demonstrated to don all clothing while sitting prior to standing, doff all clothing to knees while standing, then sit to doff clothing off from knees to feet to facilitate fall prevention, pain management, and energy conservation    Home safety: Patient instructed on home modifications and safety (raise height of ADL objects, appropriate height of chair surfaces, recliner safety, change of floor surfaces, clear pathways) to increase independence and fall prevention.  Patient indicated understanding.     Standing: Patient instructed and demonstrated to walk up to sink/countertop/surfaces, step into walker to increase safety of joint and fall prevention with Stand by Assist. Instructed to apply concept of hip contraindications to ADLs within the home (no extreme reaching across body, square off while using objects, slide objects along surfaces).  Patient instructed to increase amount of time standing, observe standing position during ADLs to increase even weight bearing through bilateral LEs to increase independence with ADLs.  Patient indicated understanding.     [facility] AM-PACTM \"6 Clicks\"                                                       Daily Activity Inpatient Short Form  AM-PAC Daily Activity - Inpatient   How much help is needed for putting on and taking off regular lower body clothing?: A Little  How much help is needed for bathing (which includes washing, rinsing, drying)?: A Little  How much help is needed for toileting (which includes using toilet, bedpan,

## 2024-05-16 NOTE — DISCHARGE SUMMARY
Ortho Discharge Summary    Patient ID:  Danielle Morris  [id number]  female  74 y.o.  1950    Admit date: 5/15/2024    Discharge date: 5/16/2024    Admitting Physician: Jori Graham MD     Consulting Physician(s):   Treatment Team: Attending Provider: Jori Graham MD; Surgeon: Jori Graham MD; Physical Therapist: Whit Montano PT; Occupational Therapist: Angelica Johnson OT; Registered Nurse: Thelma Guevara RN; : Tiffany Rubio; Utilization Reviewer: Micheline Gustafson RN    Date of Surgery:   5/15/2024     Preoperative Diagnosis:  Pain due to hip joint prosthesis (HCC) [T84.84XA, Z96.649]    Postoperative Diagnosis:   * No post-op diagnosis entered *    Procedure(s):   LEFT TOTAL HIP REVISION  ARTHROPLASTY     Anesthesia Type:   Spinal     Surgeon: Jori Graham MD                            HPI:  Pt is a 74 y.o. female who has a history of Pain due to hip joint prosthesis (HCC) [T84.84XA, Z96.649]  with pain and limitations of activities of daily living who presents at this time for a LEFT TOTAL HIP REVISION  ARTHROPLASTY following the failure of conservative management.    PMH:   Past Medical History:   Diagnosis Date    Arthritis     Bursitis     Dyslipidemia 11/12/2014    Formatting of this note might be different from the original.   Formatting of this note might be different from the original. A.  FLP (10/27/14):  Tot 171, TG 47, HDL 66, LDL 96 (Zocor 20). Formatting of this note might be different from the original. A.  FLP (10/27/14):  Tot 171, TG 47, HDL 66, LDL 96 (Zocor 20).   Formatting of this note might be different from the original. A.  FLP (10/27/14):     Hashimoto's thyroiditis     NO MEDS    History of blood transfusion     2004, 2011  INTERNAL BLEEDING FROM LOW DOSE ASPIRIN    Hyperlipidemia     Hypertension     Periodic limb movement disorder     03/2024       Body mass index is 23.52 kg/m². : A BMI > 30 is classified as obesity and > 40 is classified as

## 2024-05-16 NOTE — PROGRESS NOTES
Ortho NP Note    POD# 1  s/p LEFT TOTAL HIP REVISION  ARTHROPLASTY   Pt seen with no visitor.      Pt resting in bed comfortably. Very pleasant. Requesting to use bathroom.   Pt assisted to bsc then to chair - CGA, moving well, no dizziness   Reports postop hip pain well controlled with oxycodone, last dose 2145, plans to take dose prior to therapy   Tolerating regular diet. No nausea.   Reviewed plan of care. Feeling well and motivated to work with therapy & return home today     VSS Afebrile.    Visit Vitals  BP (!) 143/64   Pulse 74   Temp 97.5 °F (36.4 °C) (Oral)   Resp 16   Ht 1.626 m (5' 4\")   Wt 62.1 kg (137 lb)   SpO2 97%   BMI 23.52 kg/m²       Voiding status: voiding          Labs    Lab Results   Component Value Date/Time    HGB 11.6 05/16/2024 03:30 AM      Lab Results   Component Value Date/Time    INR 1.0 05/01/2024 08:25 AM      Lab Results   Component Value Date/Time     05/16/2024 03:30 AM    K 3.8 05/16/2024 03:30 AM     05/16/2024 03:30 AM    CO2 24 05/16/2024 03:30 AM    BUN 11 05/16/2024 03:30 AM     Recent Glucose Results:   Glucose   Date Value Ref Range Status   05/16/2024 91 65 - 100 mg/dL Final   05/01/2024 88 65 - 100 mg/dL Final           Body mass index is 23.52 kg/m². : A BMI > 30 is classified as obesity and > 40 is classified as morbid obesity.       Dressing c.d.i  Cryotherapy in place over incision  Calves soft and supple; No pain with passive stretch  Sensation and motor intact. +PF/DF/EHL intact 5/5, +pp  SCDs for mechanical DVT proph while in bed     PLAN:  1) PT BID, OT - WBAT  2) Aspirin 81 mg PO BID for DVT Prophylaxis. Encouraged early mobilization, bed exercises, and SCD use.  3) GI Prophylaxis - Pepcid  4) Pain control - scheduled tylenol  and toradol, and prn  tramadol and oxycodone   5) Post op care - Continue bowel regimen, encouraged IS.  Aquacel to remain in place x 7 days unless integrity is lost.   6) Readniess for discharge:     [x] Vital Signs stable

## 2024-05-16 NOTE — CARE COORDINATION
KATHLEEN:  Home with HH via [facility] - already set up with [facility] 5/17   will transport  No new DME needed    CM confirmed face sheet with Pt who lives with  and already has a walker. Confirmed HH choice and reviewed IM letter.        05/16/24 1000   Service Assessment   Patient Orientation Alert and Oriented   Cognition Alert   History Provided By Patient   Support Systems Spouse/Significant Other   Patient's Healthcare Decision Maker is: Legal Next of Kin   PCP Verified by CM Yes   Last Visit to PCP Within last 3 months   Prior Functional Level Independent in ADLs/IADLs   Current Functional Level Independent in ADLs/IADLs;Mobility   Can patient return to prior living arrangement Yes   Ability to make needs known: Good   Family able to assist with home care needs: Yes   Discharge Planning   Patient expects to be discharged to: House   Services At/After Discharge   Confirm Follow Up Transport Family   Condition of Participation: Discharge Planning   The Plan for Transition of Care is related to the following treatment goals: Home Health   The Patient and/or Patient Representative was provided with a Choice of Provider? Patient   The Patient and/Or Patient Representative agree with the Discharge Plan? Yes   Freedom of Choice list was provided with basic dialogue that supports the patient's individualized plan of care/goals, treatment preferences, and shares the quality data associated with the providers?  Yes

## 2024-05-20 PROBLEM — Z96.649 S/P REVISION OF TOTAL HIP: Status: ACTIVE | Noted: 2024-05-20

## 2025-03-18 ENCOUNTER — HOSPITAL ENCOUNTER (OUTPATIENT)
Age: 75
Discharge: HOME | End: 2025-03-21
Payer: MEDICARE

## 2025-03-18 DIAGNOSIS — H53.123 TRANSIENT VISUAL LOSS OF BOTH EYES: ICD-10-CM

## 2025-03-18 PROCEDURE — 70498 CT ANGIOGRAPHY NECK: CPT

## 2025-03-18 PROCEDURE — 6360000004 HC RX CONTRAST MEDICATION: Performed by: RADIOLOGY

## 2025-03-18 RX ORDER — IOPAMIDOL 755 MG/ML
100 INJECTION, SOLUTION INTRAVASCULAR
Status: COMPLETED | OUTPATIENT
Start: 2025-03-18 | End: 2025-03-18

## 2025-03-18 RX ADMIN — IOPAMIDOL 100 ML: 755 INJECTION, SOLUTION INTRAVENOUS at 09:39

## (undated) DEVICE — C-ARM: Brand: UNBRANDED

## (undated) DEVICE — SUTURE VICRYL 1 L27IN ABSRB CT BRAID COAT UD J281H

## (undated) DEVICE — 3M™ STERI-DRAPE™ U-DRAPE 1015: Brand: STERI-DRAPE™

## (undated) DEVICE — GLOVE SURG SZ 65 L12IN FNGR THK94MIL STD WHT LTX FREE

## (undated) DEVICE — TUBING, SUCTION, 9/32" X 12', STRAIGHT: Brand: MEDLINE INDUSTRIES, INC.

## (undated) DEVICE — SCRUBIN SCRUB BRUSH DRY STER: Brand: MEDLINE INDUSTRIES, INC.

## (undated) DEVICE — SPONGE GZ W4XL4IN COT 12 PLY TYP VII WVN C FLD DSGN STERILE

## (undated) DEVICE — 4-PORT MANIFOLD: Brand: NEPTUNE 2

## (undated) DEVICE — 6619 2 PTNT ISO SYS INCISE AREA&LT;(&GT;&&LT;)&GT;P: Brand: STERI-DRAPE™ IOBAN™ 2

## (undated) DEVICE — TOTAL JOINT - SMH: Brand: MEDLINE INDUSTRIES, INC.

## (undated) DEVICE — HANDPIECE SET WITH BONE CLEANING TIP AND SUCTION TUBE: Brand: INTERPULSE

## (undated) DEVICE — SUTURE VICRYL ABSORBABLE BRAIDED 2-0 CT 36 IN DA UD  VCP957H

## (undated) DEVICE — COVER,MAYO STAND,STERILE: Brand: MEDLINE

## (undated) DEVICE — SOLUTION SURG PREP 26 CC PURPREP

## (undated) DEVICE — BLADE SAW W0.49XL3.15IN THK0.047IN CUT THK0.047IN REPL SAG

## (undated) DEVICE — BLADE SAW W11.2XL77.5MM THK0.76MM CUT THK1.17MM REPL RECIP

## (undated) DEVICE — SUTURE STRATAFIX SYMMETRIC PDS + SZ 1 L18IN ABSRB VLT L48MM SXPP1A400

## (undated) DEVICE — ELECTRODE PT RET AD L9FT HI MOIST COND ADH HYDRGEL CORDED

## (undated) DEVICE — GLOVE SURG SZ 65 L12IN FNGR THK79MIL GRN LTX FREE

## (undated) DEVICE — SOLUTION IRRIG 3000ML 0.9% SOD CHL USP UROMATIC PLAS CONT

## (undated) DEVICE — Device: Brand: JELCO

## (undated) DEVICE — DRSG POSTOP PRMSL AG 3.5X14IN

## (undated) DEVICE — CONTAINER,SPECIMEN,3OZ,OR STRL: Brand: MEDLINE

## (undated) DEVICE — DRESSING HYDROCOLLOID BORDER 35X10 IN ALUM PRIMASEAL

## (undated) DEVICE — SUTURE ETHIBOND EXCEL SZ 2 L30IN NONABSORBABLE GRN L40MM V-37 MX69G

## (undated) DEVICE — SYRINGE MED 30ML STD CLR PLAS LUERLOCK TIP N CTRL DISP

## (undated) DEVICE — GOWN,SIRUS,NONRNF,SETINSLV,2XL,18/CS: Brand: MEDLINE

## (undated) DEVICE — GLOVE ORTHO 8   MSG9480

## (undated) DEVICE — HOOD, PEEL-AWAY: Brand: FLYTE

## (undated) DEVICE — GLOVE SURG SZ 8 L12IN FNGR THK94MIL STD WHT LTX FREE

## (undated) DEVICE — APPLICATOR MEDICATED 26 CC SOLUTION HI LT ORNG CHLORAPREP

## (undated) DEVICE — LIQUIBAND RAPID ADHESIVE 36/CS 0.8ML: Brand: MEDLINE

## (undated) DEVICE — PADDING CAST W6INXL4YD NONSTERILE COT RAYON MICROPLEATED

## (undated) DEVICE — SYRINGE 20ML LL S/C 50

## (undated) DEVICE — SUTURE MONOCRYL SZ 3-0 L27IN ABSRB UD L24MM PS-1 3/8 CIR PRIM Y936H

## (undated) DEVICE — HYPODERMIC SAFETY NEEDLE: Brand: MAGELLAN

## (undated) DEVICE — BLADE ES L6IN ELASTOMERIC COAT EXT DURABLE BEND UPTO 90DEG